# Patient Record
Sex: MALE | Race: BLACK OR AFRICAN AMERICAN
[De-identification: names, ages, dates, MRNs, and addresses within clinical notes are randomized per-mention and may not be internally consistent; named-entity substitution may affect disease eponyms.]

---

## 2018-08-31 ENCOUNTER — HOSPITAL ENCOUNTER (OUTPATIENT)
Dept: HOSPITAL 92 - SCSMRI | Age: 18
Discharge: HOME | End: 2018-08-31
Attending: ORTHOPAEDIC SURGERY
Payer: COMMERCIAL

## 2018-08-31 DIAGNOSIS — S70.12XD: ICD-10-CM

## 2018-08-31 DIAGNOSIS — M23.92: Primary | ICD-10-CM

## 2018-08-31 NOTE — MRI
MRI LEFT KNEE WITHOUT CONTRAST:

 

Date:  08/31/18 

 

HISTORY:  

M23.92, internal derangement of left knee. Knee pain. 

 

COMPARISON:  

Knee radiograph dated 08/21/18. 

 

FINDINGS:

 

Medial Meniscus:

Abnormally increased signal of the posterior medial meniscal capsular junction. 

 

Lateral Meniscus:

Intact. 

 

ACL and PCL:

Intact. 

 

MCL and LCL:

Intact. '

 

Extensor Mechanism:

Quadriceps tendon, patella, and patellar tendon are all intact. 

 

Cartilage:

Patellofemoral compartment:  Intact. 

Medial compartment:  Intact. 

Lateral compartment:  Intact. 

 

Bones:

There is a subtle contusion of the nonarticular surface of the medial femoral condyle. 

 

Muscles:

Muscle signal and bulk is normal. 

 

IMPRESSION: 

1.  Subtle trabecular contusion of the medial femoral condyle which is suprameniscal. 

 

2.  Low grade medial meniscal capsular injury. 

 

 

POS: [facility]